# Patient Record
Sex: FEMALE | URBAN - NONMETROPOLITAN AREA
[De-identification: names, ages, dates, MRNs, and addresses within clinical notes are randomized per-mention and may not be internally consistent; named-entity substitution may affect disease eponyms.]

---

## 2021-05-10 ENCOUNTER — APPOINTMENT (OUTPATIENT)
Age: 31
Setting detail: DERMATOLOGY
End: 2021-05-10

## 2021-05-10 DIAGNOSIS — D22 MELANOCYTIC NEVI: ICD-10-CM

## 2021-05-10 DIAGNOSIS — L71.8 OTHER ROSACEA: ICD-10-CM

## 2021-05-10 DIAGNOSIS — L91.0 HYPERTROPHIC SCAR: ICD-10-CM

## 2021-05-10 DIAGNOSIS — L73.2 HIDRADENITIS SUPPURATIVA: ICD-10-CM

## 2021-05-10 PROBLEM — D22.4 MELANOCYTIC NEVI OF SCALP AND NECK: Status: ACTIVE | Noted: 2021-05-10

## 2021-05-10 PROCEDURE — OTHER COUNSELING: OTHER

## 2021-05-10 PROCEDURE — OTHER INTRALESIONAL KENALOG: OTHER

## 2021-05-10 PROCEDURE — OTHER PRESCRIPTION: OTHER

## 2021-05-10 PROCEDURE — 99203 OFFICE O/P NEW LOW 30 MIN: CPT | Mod: 25

## 2021-05-10 PROCEDURE — 11900 INJECT SKIN LESIONS </W 7: CPT

## 2021-05-10 RX ORDER — AZELAIC ACID 0.15 G/G
GEL TOPICAL
Qty: 1 | Refills: 3 | Status: ERX | COMMUNITY
Start: 2021-05-10

## 2021-05-10 RX ORDER — TAZAROTENE 0.05 MG/G
CREAM CUTANEOUS QHS
Qty: 1 | Refills: 3 | Status: ERX | COMMUNITY
Start: 2021-05-10

## 2021-05-10 RX ORDER — CLINDAMYCIN PHOSPHATE 10 MG/ML
SOLUTION TOPICAL
Qty: 1 | Refills: 6 | Status: ERX | COMMUNITY
Start: 2021-05-10

## 2021-05-10 ASSESSMENT — LOCATION SIMPLE DESCRIPTION DERM
LOCATION SIMPLE: LEFT FOREHEAD
LOCATION SIMPLE: CHEST
LOCATION SIMPLE: POSTERIOR NECK
LOCATION SIMPLE: GROIN

## 2021-05-10 ASSESSMENT — LOCATION ZONE DERM
LOCATION ZONE: VULVA
LOCATION ZONE: TRUNK
LOCATION ZONE: NECK
LOCATION ZONE: FACE

## 2021-05-10 ASSESSMENT — LOCATION DETAILED DESCRIPTION DERM
LOCATION DETAILED: MIDDLE STERNUM
LOCATION DETAILED: LEFT MEDIAL TRAPEZIAL NECK
LOCATION DETAILED: LEFT INFERIOR FOREHEAD
LOCATION DETAILED: MONS PUBIS

## 2021-05-10 NOTE — HPI: FULL BODY SKIN EXAMINATION
What Type Of Note Output Would You Prefer (Optional)?: Bullet Format
What Is The Reason For Today's Visit?: Full Body Skin Examination
What Is The Reason For Today's Visit? (Being Monitored For X): concerning skin lesions on an annual basis
Additional History: Pt states she is in the sun a lot, has moles, and wants to talk about acne treatment

## 2021-05-10 NOTE — PROCEDURE: INTRALESIONAL KENALOG
Include Z78.9 (Other Specified Conditions Influencing Health Status) As An Associated Diagnosis?: No
Kenalog Preparation: Kenalog
Validate Note Data When Using Inventory: Yes
Concentration Of Solution Injected (Mg/Ml): 0.3
Administered By (Optional): nurse
Medical Necessity Clause: This procedure was medically necessary because the lesions that were treated were:
Detail Level: Detailed
Consent: The risks of atrophy were reviewed with the patient.
Total Volume Injected (Ccs- Only Use Numbers And Decimals): 0.4
X Size Of Lesion In Cm (Optional): 0

## 2021-05-10 NOTE — PROCEDURE: COUNSELING
Doxycycline Counseling:  Patient counseled regarding possible photosensitivity and increased risk for sunburn.  Patient instructed to avoid sunlight, if possible.  When exposed to sunlight, patients should wear protective clothing, sunglasses, and sunscreen.  The patient was instructed to call the office immediately if the following severe adverse effects occur:  hearing changes, easy bruising/bleeding, severe headache, or vision changes.  The patient verbalized understanding of the proper use and possible adverse effects of doxycycline.  All of the patient's questions and concerns were addressed.
Minocycline Counseling: Patient advised regarding possible photosensitivity and discoloration of the teeth, skin, lips, tongue and gums.  Patient instructed to avoid sunlight, if possible.  When exposed to sunlight, patients should wear protective clothing, sunglasses, and sunscreen.  The patient was instructed to call the office immediately if the following severe adverse effects occur:  hearing changes, easy bruising/bleeding, severe headache, or vision changes.  The patient verbalized understanding of the proper use and possible adverse effects of minocycline.  All of the patient's questions and concerns were addressed.
Tetracycline Counseling: Patient counseled regarding possible photosensitivity and increased risk for sunburn.  Patient instructed to avoid sunlight, if possible.  When exposed to sunlight, patients should wear protective clothing, sunglasses, and sunscreen.  The patient was instructed to call the office immediately if the following severe adverse effects occur:  hearing changes, easy bruising/bleeding, severe headache, or vision changes.  The patient verbalized understanding of the proper use and possible adverse effects of tetracycline.  All of the patient's questions and concerns were addressed. Patient understands to avoid pregnancy while on therapy due to potential birth defects.
Dapsone Counseling: I discussed with the patient the risks of dapsone including but not limited to hemolytic anemia, agranulocytosis, rashes, methemoglobinemia, kidney failure, peripheral neuropathy, headaches, GI upset, and liver toxicity.  Patients who start dapsone require monitoring including baseline LFTs and weekly CBCs for the first month, then every month thereafter.  The patient verbalized understanding of the proper use and possible adverse effects of dapsone.  All of the patient's questions and concerns were addressed.
Doxycycline Pregnancy And Lactation Text: This medication is Pregnancy Category D and not consider safe during pregnancy. It is also excreted in breast milk but is considered safe for shorter treatment courses.
Tetracycline Pregnancy And Lactation Text: This medication is Pregnancy Category D and not consider safe during pregnancy. It is also excreted in breast milk.
Detail Level: Detailed
Topical Clindamycin Pregnancy And Lactation Text: This medication is Pregnancy Category B and is considered safe during pregnancy. It is unknown if it is excreted in breast milk.
Tazorac Pregnancy And Lactation Text: This medication is not safe during pregnancy. It is unknown if this medication is excreted in breast milk.
Sarecycline Counseling: Patient advised regarding possible photosensitivity and discoloration of the teeth, skin, lips, tongue and gums.  Patient instructed to avoid sunlight, if possible.  When exposed to sunlight, patients should wear protective clothing, sunglasses, and sunscreen.  The patient was instructed to call the office immediately if the following severe adverse effects occur:  hearing changes, easy bruising/bleeding, severe headache, or vision changes.  The patient verbalized understanding of the proper use and possible adverse effects of sarecycline.  All of the patient's questions and concerns were addressed.
Tazorac Counseling:  Patient advised that medication is irritating and drying.  Patient may need to apply sparingly and wash off after an hour before eventually leaving it on overnight.  The patient verbalized understanding of the proper use and possible adverse effects of tazorac.  All of the patient's questions and concerns were addressed.
Bactrim Pregnancy And Lactation Text: This medication is Pregnancy Category D and is known to cause fetal risk.  It is also excreted in breast milk.
Azithromycin Pregnancy And Lactation Text: This medication is considered safe during pregnancy and is also secreted in breast milk.
Topical Sulfur Applications Counseling: Topical Sulfur Counseling: Patient counseled that this medication may cause skin irritation or allergic reactions.  In the event of skin irritation, the patient was advised to reduce the amount of the drug applied or use it less frequently.   The patient verbalized understanding of the proper use and possible adverse effects of topical sulfur application.  All of the patient's questions and concerns were addressed.
Benzoyl Peroxide Pregnancy And Lactation Text: This medication is Pregnancy Category C. It is unknown if benzoyl peroxide is excreted in breast milk.
Isotretinoin Counseling: Patient should get monthly blood tests, not donate blood, not drive at night if vision affected, not share medication, and not undergo elective surgery for 6 months after tx completed. Side effects reviewed, pt to contact office should one occur.
Birth Control Pills Pregnancy And Lactation Text: This medication should be avoided if pregnant and for the first 30 days post-partum.
Topical Retinoid counseling:  Patient advised to apply a pea-sized amount only at bedtime and wait 30 minutes after washing their face before applying.  If too drying, patient may add a non-comedogenic moisturizer. The patient verbalized understanding of the proper use and possible adverse effects of retinoids.  All of the patient's questions and concerns were addressed.
Use Enhanced Medication Counseling?: No
Topical Retinoid Pregnancy And Lactation Text: This medication is Pregnancy Category C. It is unknown if this medication is excreted in breast milk.
Birth Control Pills Counseling: Birth Control Pill Counseling: I discussed with the patient the potential side effects of OCPs including but not limited to increased risk of stroke, heart attack, thrombophlebitis, deep venous thrombosis, hepatic adenomas, breast changes, GI upset, headaches, and depression.  The patient verbalized understanding of the proper use and possible adverse effects of OCPs. All of the patient's questions and concerns were addressed.
Topical Clindamycin Counseling: Patient counseled that this medication may cause skin irritation or allergic reactions.  In the event of skin irritation, the patient was advised to reduce the amount of the drug applied or use it less frequently.   The patient verbalized understanding of the proper use and possible adverse effects of clindamycin.  All of the patient's questions and concerns were addressed.
Spironolactone Pregnancy And Lactation Text: This medication can cause feminization of the male fetus and should be avoided during pregnancy. The active metabolite is also found in breast milk.
Topical Sulfur Applications Pregnancy And Lactation Text: This medication is Pregnancy Category C and has an unknown safety profile during pregnancy. It is unknown if this topical medication is excreted in breast milk.
Spironolactone Counseling: Patient advised regarding risks of diarrhea, abdominal pain, hyperkalemia, birth defects (for female patients), liver toxicity and renal toxicity. The patient may need blood work to monitor liver and kidney function and potassium levels while on therapy. The patient verbalized understanding of the proper use and possible adverse effects of spironolactone.  All of the patient's questions and concerns were addressed.
Azithromycin Counseling:  I discussed with the patient the risks of azithromycin including but not limited to GI upset, allergic reaction, drug rash, diarrhea, and yeast infections.
High Dose Vitamin A Pregnancy And Lactation Text: High dose vitamin A therapy is contraindicated during pregnancy and breast feeding.
Isotretinoin Pregnancy And Lactation Text: This medication is Pregnancy Category X and is considered extremely dangerous during pregnancy. It is unknown if it is excreted in breast milk.
Bactrim Counseling:  I discussed with the patient the risks of sulfa antibiotics including but not limited to GI upset, allergic reaction, drug rash, diarrhea, dizziness, photosensitivity, and yeast infections.  Rarely, more serious reactions can occur including but not limited to aplastic anemia, agranulocytosis, methemoglobinemia, blood dyscrasias, liver or kidney failure, lung infiltrates or desquamative/blistering drug rashes.
Benzoyl Peroxide Counseling: Patient counseled that medicine may cause skin irritation and bleach clothing.  In the event of skin irritation, the patient was advised to reduce the amount of the drug applied or use it less frequently.   The patient verbalized understanding of the proper use and possible adverse effects of benzoyl peroxide.  All of the patient's questions and concerns were addressed.
Erythromycin Counseling:  I discussed with the patient the risks of erythromycin including but not limited to GI upset, allergic reaction, drug rash, diarrhea, increase in liver enzymes, and yeast infections.
Erythromycin Pregnancy And Lactation Text: This medication is Pregnancy Category B and is considered safe during pregnancy. It is also excreted in breast milk.
High Dose Vitamin A Counseling: Side effects reviewed, pt to contact office should one occur.
Detail Level: Zone
Dapsone Pregnancy And Lactation Text: This medication is Pregnancy Category C and is not considered safe during pregnancy or breast feeding.

## 2021-08-10 ENCOUNTER — APPOINTMENT (OUTPATIENT)
Age: 31
Setting detail: DERMATOLOGY
End: 2021-08-26

## 2021-08-10 DIAGNOSIS — L73.2 HIDRADENITIS SUPPURATIVA: ICD-10-CM

## 2021-08-10 DIAGNOSIS — L91.0 HYPERTROPHIC SCAR: ICD-10-CM

## 2021-08-10 DIAGNOSIS — L70.0 ACNE VULGARIS: ICD-10-CM

## 2021-08-10 PROCEDURE — OTHER COUNSELING: OTHER

## 2021-08-10 PROCEDURE — OTHER ISOTRETINOIN INITIATION: OTHER

## 2021-08-10 PROCEDURE — 36415 COLL VENOUS BLD VENIPUNCTURE: CPT

## 2021-08-10 PROCEDURE — OTHER ORDER TESTS: OTHER

## 2021-08-10 PROCEDURE — OTHER INTRALESIONAL KENALOG: OTHER

## 2021-08-10 PROCEDURE — 99214 OFFICE O/P EST MOD 30 MIN: CPT | Mod: 25

## 2021-08-10 PROCEDURE — 11900 INJECT SKIN LESIONS </W 7: CPT

## 2021-08-10 PROCEDURE — OTHER VENIPUNCTURE: OTHER

## 2021-08-10 PROCEDURE — OTHER ADDITIONAL NOTES: OTHER

## 2021-08-10 ASSESSMENT — LOCATION DETAILED DESCRIPTION DERM
LOCATION DETAILED: LEFT INFERIOR FOREHEAD
LOCATION DETAILED: MONS PUBIS
LOCATION DETAILED: MIDDLE STERNUM
LOCATION DETAILED: LEFT ANTECUBITAL SKIN

## 2021-08-10 ASSESSMENT — LOCATION ZONE DERM
LOCATION ZONE: TRUNK
LOCATION ZONE: VULVA
LOCATION ZONE: ARM
LOCATION ZONE: FACE

## 2021-08-10 ASSESSMENT — LOCATION SIMPLE DESCRIPTION DERM
LOCATION SIMPLE: GROIN
LOCATION SIMPLE: CHEST
LOCATION SIMPLE: LEFT FOREHEAD
LOCATION SIMPLE: LEFT UPPER ARM

## 2021-08-10 NOTE — PROCEDURE: ADDITIONAL NOTES
Additional Notes: Pt is deciding wether or not if she wants to try a biologic like humira
Render Risk Assessment In Note?: no
Detail Level: Zone

## 2021-08-10 NOTE — PROCEDURE: ISOTRETINOIN INITIATION
Detail Level: Zone
Anticipated Starting Dosage (Optional): 40mg Daily
Patient Reported Weight (Optional - Include Units): 218

## 2021-08-10 NOTE — PROCEDURE: INTRALESIONAL KENALOG
Total Volume Injected (Ccs- Only Use Numbers And Decimals): 0.4
Medical Necessity Clause: This procedure was medically necessary because the lesions that were treated were:
Detail Level: Detailed
Consent: The risks of atrophy were reviewed with the patient.
X Size Of Lesion In Cm (Optional): 0
Include Z78.9 (Other Specified Conditions Influencing Health Status) As An Associated Diagnosis?: No
Concentration Of Solution Injected (Mg/Ml): 0.3
Kenalog Preparation: Kenalog
Validate Note Data When Using Inventory: Yes
Administered By (Optional): nurse

## 2021-08-10 NOTE — PROCEDURE: COUNSELING
Minocycline Pregnancy And Lactation Text: This medication is Pregnancy Category D and not consider safe during pregnancy. It is also excreted in breast milk.
Topical Clindamycin Counseling: Patient counseled that this medication may cause skin irritation or allergic reactions.  In the event of skin irritation, the patient was advised to reduce the amount of the drug applied or use it less frequently.   The patient verbalized understanding of the proper use and possible adverse effects of clindamycin.  All of the patient's questions and concerns were addressed.
Spironolactone Pregnancy And Lactation Text: This medication can cause feminization of the male fetus and should be avoided during pregnancy. The active metabolite is also found in breast milk.
Erythromycin Pregnancy And Lactation Text: This medication is Pregnancy Category B and is considered safe during pregnancy. It is also excreted in breast milk.
Dapsone Counseling: I discussed with the patient the risks of dapsone including but not limited to hemolytic anemia, agranulocytosis, rashes, methemoglobinemia, kidney failure, peripheral neuropathy, headaches, GI upset, and liver toxicity.  Patients who start dapsone require monitoring including baseline LFTs and weekly CBCs for the first month, then every month thereafter.  The patient verbalized understanding of the proper use and possible adverse effects of dapsone.  All of the patient's questions and concerns were addressed.
Detail Level: Detailed
Erythromycin Counseling:  I discussed with the patient the risks of erythromycin including but not limited to GI upset, allergic reaction, drug rash, diarrhea, increase in liver enzymes, and yeast infections.
Minocycline Counseling: Patient advised regarding possible photosensitivity and discoloration of the teeth, skin, lips, tongue and gums.  Patient instructed to avoid sunlight, if possible.  When exposed to sunlight, patients should wear protective clothing, sunglasses, and sunscreen.  The patient was instructed to call the office immediately if the following severe adverse effects occur:  hearing changes, easy bruising/bleeding, severe headache, or vision changes.  The patient verbalized understanding of the proper use and possible adverse effects of minocycline.  All of the patient's questions and concerns were addressed.
High Dose Vitamin A Counseling: Side effects reviewed, pt to contact office should one occur.
Tazorac Counseling:  Patient advised that medication is irritating and drying.  Patient may need to apply sparingly and wash off after an hour before eventually leaving it on overnight.  The patient verbalized understanding of the proper use and possible adverse effects of tazorac.  All of the patient's questions and concerns were addressed.
Doxycycline Counseling:  Patient counseled regarding possible photosensitivity and increased risk for sunburn.  Patient instructed to avoid sunlight, if possible.  When exposed to sunlight, patients should wear protective clothing, sunglasses, and sunscreen.  The patient was instructed to call the office immediately if the following severe adverse effects occur:  hearing changes, easy bruising/bleeding, severe headache, or vision changes.  The patient verbalized understanding of the proper use and possible adverse effects of doxycycline.  All of the patient's questions and concerns were addressed.
Doxycycline Pregnancy And Lactation Text: This medication is Pregnancy Category D and not consider safe during pregnancy. It is also excreted in breast milk but is considered safe for shorter treatment courses.
Include Pregnancy/Lactation Warning?: No
Benzoyl Peroxide Pregnancy And Lactation Text: This medication is Pregnancy Category C. It is unknown if benzoyl peroxide is excreted in breast milk.
Azithromycin Pregnancy And Lactation Text: This medication is considered safe during pregnancy and is also secreted in breast milk.
Topical Clindamycin Pregnancy And Lactation Text: This medication is Pregnancy Category B and is considered safe during pregnancy. It is unknown if it is excreted in breast milk.
Sarecycline Counseling: Patient advised regarding possible photosensitivity and discoloration of the teeth, skin, lips, tongue and gums.  Patient instructed to avoid sunlight, if possible.  When exposed to sunlight, patients should wear protective clothing, sunglasses, and sunscreen.  The patient was instructed to call the office immediately if the following severe adverse effects occur:  hearing changes, easy bruising/bleeding, severe headache, or vision changes.  The patient verbalized understanding of the proper use and possible adverse effects of sarecycline.  All of the patient's questions and concerns were addressed.
Tazorac Pregnancy And Lactation Text: This medication is not safe during pregnancy. It is unknown if this medication is excreted in breast milk.
Tetracycline Counseling: Patient counseled regarding possible photosensitivity and increased risk for sunburn.  Patient instructed to avoid sunlight, if possible.  When exposed to sunlight, patients should wear protective clothing, sunglasses, and sunscreen.  The patient was instructed to call the office immediately if the following severe adverse effects occur:  hearing changes, easy bruising/bleeding, severe headache, or vision changes.  The patient verbalized understanding of the proper use and possible adverse effects of tetracycline.  All of the patient's questions and concerns were addressed. Patient understands to avoid pregnancy while on therapy due to potential birth defects.
Topical Sulfur Applications Counseling: Topical Sulfur Counseling: Patient counseled that this medication may cause skin irritation or allergic reactions.  In the event of skin irritation, the patient was advised to reduce the amount of the drug applied or use it less frequently.   The patient verbalized understanding of the proper use and possible adverse effects of topical sulfur application.  All of the patient's questions and concerns were addressed.
Birth Control Pills Pregnancy And Lactation Text: This medication should be avoided if pregnant and for the first 30 days post-partum.
Bactrim Pregnancy And Lactation Text: This medication is Pregnancy Category D and is known to cause fetal risk.  It is also excreted in breast milk.
Topical Retinoid counseling:  Patient advised to apply a pea-sized amount only at bedtime and wait 30 minutes after washing their face before applying.  If too drying, patient may add a non-comedogenic moisturizer. The patient verbalized understanding of the proper use and possible adverse effects of retinoids.  All of the patient's questions and concerns were addressed.
Topical Retinoid Pregnancy And Lactation Text: This medication is Pregnancy Category C. It is unknown if this medication is excreted in breast milk.
Birth Control Pills Counseling: Birth Control Pill Counseling: I discussed with the patient the potential side effects of OCPs including but not limited to increased risk of stroke, heart attack, thrombophlebitis, deep venous thrombosis, hepatic adenomas, breast changes, GI upset, headaches, and depression.  The patient verbalized understanding of the proper use and possible adverse effects of OCPs. All of the patient's questions and concerns were addressed.
Azithromycin Counseling:  I discussed with the patient the risks of azithromycin including but not limited to GI upset, allergic reaction, drug rash, diarrhea, and yeast infections.
Isotretinoin Counseling: Patient should get monthly blood tests, not donate blood, not drive at night if vision affected, not share medication, and not undergo elective surgery for 6 months after tx completed. Side effects reviewed, pt to contact office should one occur.
Spironolactone Counseling: Patient advised regarding risks of diarrhea, abdominal pain, hyperkalemia, birth defects (for female patients), liver toxicity and renal toxicity. The patient may need blood work to monitor liver and kidney function and potassium levels while on therapy. The patient verbalized understanding of the proper use and possible adverse effects of spironolactone.  All of the patient's questions and concerns were addressed.
Topical Sulfur Applications Pregnancy And Lactation Text: This medication is Pregnancy Category C and has an unknown safety profile during pregnancy. It is unknown if this topical medication is excreted in breast milk.
Bactrim Counseling:  I discussed with the patient the risks of sulfa antibiotics including but not limited to GI upset, allergic reaction, drug rash, diarrhea, dizziness, photosensitivity, and yeast infections.  Rarely, more serious reactions can occur including but not limited to aplastic anemia, agranulocytosis, methemoglobinemia, blood dyscrasias, liver or kidney failure, lung infiltrates or desquamative/blistering drug rashes.
Dapsone Pregnancy And Lactation Text: This medication is Pregnancy Category C and is not considered safe during pregnancy or breast feeding.
High Dose Vitamin A Pregnancy And Lactation Text: High dose vitamin A therapy is contraindicated during pregnancy and breast feeding.
Isotretinoin Pregnancy And Lactation Text: This medication is Pregnancy Category X and is considered extremely dangerous during pregnancy. It is unknown if it is excreted in breast milk.
Benzoyl Peroxide Counseling: Patient counseled that medicine may cause skin irritation and bleach clothing.  In the event of skin irritation, the patient was advised to reduce the amount of the drug applied or use it less frequently.   The patient verbalized understanding of the proper use and possible adverse effects of benzoyl peroxide.  All of the patient's questions and concerns were addressed.

## 2021-09-13 ENCOUNTER — APPOINTMENT (OUTPATIENT)
Age: 31
Setting detail: DERMATOLOGY
End: 2021-09-13

## 2021-09-13 DIAGNOSIS — L70.0 ACNE VULGARIS: ICD-10-CM

## 2021-09-13 PROCEDURE — OTHER URINE PREGNANCY TEST: OTHER

## 2021-09-13 PROCEDURE — 81025 URINE PREGNANCY TEST: CPT

## 2021-09-13 PROCEDURE — OTHER PHOTO-DOCUMENTATION: OTHER

## 2021-09-13 PROCEDURE — 99213 OFFICE O/P EST LOW 20 MIN: CPT

## 2021-09-13 PROCEDURE — OTHER ISOTRETINOIN MONITORING: OTHER

## 2021-09-13 PROCEDURE — OTHER PRESCRIPTION: OTHER

## 2021-09-13 RX ORDER — ISOTRETINOIN 40 MG/1
CAPSULE, LIQUID FILLED ORAL
Qty: 30 | Refills: 0 | Status: ERX | COMMUNITY
Start: 2021-09-13

## 2021-09-13 NOTE — PROCEDURE: PHOTO-DOCUMENTATION
Photo Preface (Leave Blank If You Do Not Want): Photographs were obtained today
Detail Level: Zone
Details (Free Text): 9/13/2021

## 2021-09-16 ENCOUNTER — RX ONLY (RX ONLY)
Age: 31
End: 2021-09-16

## 2021-09-16 RX ORDER — ISOTRETINOIN 40 MG/1
CAPSULE, LIQUID FILLED ORAL
Qty: 30 | Refills: 0 | Status: ERX

## 2021-10-13 ENCOUNTER — APPOINTMENT (OUTPATIENT)
Age: 31
Setting detail: DERMATOLOGY
End: 2021-11-03

## 2021-10-13 DIAGNOSIS — L70.0 ACNE VULGARIS: ICD-10-CM

## 2021-10-13 PROCEDURE — OTHER PRESCRIPTION: OTHER

## 2021-10-13 PROCEDURE — OTHER URINE PREGNANCY TEST: OTHER

## 2021-10-13 PROCEDURE — 81025 URINE PREGNANCY TEST: CPT

## 2021-10-13 PROCEDURE — 99214 OFFICE O/P EST MOD 30 MIN: CPT

## 2021-10-13 PROCEDURE — OTHER ISOTRETINOIN MONITORING: OTHER

## 2021-10-13 PROCEDURE — OTHER PHOTO-DOCUMENTATION: OTHER

## 2021-10-13 PROCEDURE — OTHER ORDER TESTS: OTHER

## 2021-10-13 RX ORDER — ISOTRETINOIN 30 MG/1
CAPSULE ORAL
Qty: 30 | Refills: 0 | Status: ERX | COMMUNITY
Start: 2021-10-13

## 2021-10-13 NOTE — PROCEDURE: PHOTO-DOCUMENTATION
Detail Level: Zone
Details (Free Text): 9/13/2021
Photo Preface (Leave Blank If You Do Not Want): Photographs were obtained today

## 2021-11-16 ENCOUNTER — APPOINTMENT (OUTPATIENT)
Age: 31
Setting detail: DERMATOLOGY
End: 2021-11-16

## 2021-11-16 DIAGNOSIS — L70.0 ACNE VULGARIS: ICD-10-CM

## 2021-11-16 DIAGNOSIS — L20.89 OTHER ATOPIC DERMATITIS: ICD-10-CM

## 2021-11-16 PROCEDURE — 36415 COLL VENOUS BLD VENIPUNCTURE: CPT

## 2021-11-16 PROCEDURE — OTHER VENIPUNCTURE: OTHER

## 2021-11-16 PROCEDURE — OTHER PRESCRIPTION: OTHER

## 2021-11-16 PROCEDURE — 81025 URINE PREGNANCY TEST: CPT

## 2021-11-16 PROCEDURE — OTHER ORDER TESTS: OTHER

## 2021-11-16 PROCEDURE — 99214 OFFICE O/P EST MOD 30 MIN: CPT | Mod: 25

## 2021-11-16 PROCEDURE — OTHER URINE PREGNANCY TEST: OTHER

## 2021-11-16 PROCEDURE — OTHER PRESCRIPTION MEDICATION MANAGEMENT: OTHER

## 2021-11-16 PROCEDURE — OTHER COUNSELING: OTHER

## 2021-11-16 PROCEDURE — OTHER ISOTRETINOIN MONITORING: OTHER

## 2021-11-16 RX ORDER — ISOTRETINOIN 30 MG/1
CAPSULE ORAL
Qty: 30 | Refills: 0 | Status: ERX

## 2021-11-16 RX ORDER — TRIAMCINOLONE ACETONIDE 1 MG/G
CREAM TOPICAL BID
Qty: 45 | Refills: 2 | Status: ERX | COMMUNITY
Start: 2021-11-16

## 2021-11-16 ASSESSMENT — LOCATION ZONE DERM
LOCATION ZONE: HAND
LOCATION ZONE: ARM

## 2021-11-16 ASSESSMENT — LOCATION DETAILED DESCRIPTION DERM
LOCATION DETAILED: LEFT RADIAL DORSAL HAND
LOCATION DETAILED: RIGHT RADIAL DORSAL HAND
LOCATION DETAILED: LEFT ANTECUBITAL SKIN

## 2021-11-16 ASSESSMENT — LOCATION SIMPLE DESCRIPTION DERM
LOCATION SIMPLE: LEFT HAND
LOCATION SIMPLE: LEFT UPPER ARM
LOCATION SIMPLE: RIGHT HAND

## 2021-12-08 ENCOUNTER — APPOINTMENT (OUTPATIENT)
Age: 31
Setting detail: DERMATOLOGY
End: 2021-12-08

## 2021-12-08 DIAGNOSIS — L70.0 ACNE VULGARIS: ICD-10-CM

## 2021-12-08 PROCEDURE — OTHER URINE PREGNANCY TEST: OTHER

## 2021-12-08 PROCEDURE — OTHER PRESCRIPTION: OTHER

## 2021-12-08 PROCEDURE — OTHER ISOTRETINOIN MONITORING: OTHER

## 2021-12-08 PROCEDURE — 99213 OFFICE O/P EST LOW 20 MIN: CPT

## 2021-12-08 PROCEDURE — 81025 URINE PREGNANCY TEST: CPT

## 2021-12-08 RX ORDER — ISOTRETINOIN 30 MG/1
CAPSULE ORAL
Qty: 30 | Refills: 0 | Status: ERX

## 2021-12-13 ENCOUNTER — RX ONLY (RX ONLY)
Age: 31
End: 2021-12-13

## 2021-12-13 RX ORDER — ISOTRETINOIN 30 MG/1
CAPSULE, LIQUID FILLED ORAL
Qty: 30 | Refills: 0 | Status: ERX

## 2022-01-11 ENCOUNTER — APPOINTMENT (OUTPATIENT)
Age: 32
Setting detail: DERMATOLOGY
End: 2022-01-13

## 2022-01-11 DIAGNOSIS — L70.0 ACNE VULGARIS: ICD-10-CM

## 2022-01-11 PROCEDURE — OTHER PRESCRIPTION: OTHER

## 2022-01-11 PROCEDURE — OTHER ISOTRETINOIN MONITORING: OTHER

## 2022-01-11 PROCEDURE — OTHER URINE PREGNANCY TEST: OTHER

## 2022-01-11 PROCEDURE — 99213 OFFICE O/P EST LOW 20 MIN: CPT

## 2022-01-11 PROCEDURE — 81025 URINE PREGNANCY TEST: CPT

## 2022-01-11 RX ORDER — ISOTRETINOIN 30 MG/1
CAPSULE ORAL
Qty: 30 | Refills: 0 | Status: ERX

## 2022-02-10 ENCOUNTER — APPOINTMENT (OUTPATIENT)
Age: 32
Setting detail: DERMATOLOGY
End: 2022-02-10

## 2022-02-10 DIAGNOSIS — L70.0 ACNE VULGARIS: ICD-10-CM

## 2022-02-10 PROCEDURE — OTHER ISOTRETINOIN MONITORING: OTHER

## 2022-02-10 PROCEDURE — 81025 URINE PREGNANCY TEST: CPT

## 2022-02-10 PROCEDURE — 99213 OFFICE O/P EST LOW 20 MIN: CPT

## 2022-02-10 PROCEDURE — OTHER URINE PREGNANCY TEST: OTHER

## 2022-02-10 PROCEDURE — OTHER PRESCRIPTION: OTHER

## 2022-02-10 RX ORDER — ISOTRETINOIN 40 MG/1
CAPSULE, LIQUID FILLED ORAL
Qty: 30 | Refills: 0 | Status: ERX | COMMUNITY
Start: 2022-02-10

## 2022-03-10 ENCOUNTER — APPOINTMENT (OUTPATIENT)
Age: 32
Setting detail: DERMATOLOGY
End: 2022-03-10

## 2022-03-10 VITALS — HEIGHT: 64 IN | WEIGHT: 211 LBS

## 2022-03-10 DIAGNOSIS — L70.0 ACNE VULGARIS: ICD-10-CM

## 2022-03-10 DIAGNOSIS — Z79.899 OTHER LONG TERM (CURRENT) DRUG THERAPY: ICD-10-CM

## 2022-03-10 PROCEDURE — OTHER COUNSELING: OTHER

## 2022-03-10 PROCEDURE — OTHER MIPS QUALITY: OTHER

## 2022-03-10 PROCEDURE — OTHER HIGH RISK MEDICATION MONITORING: OTHER

## 2022-03-10 PROCEDURE — 99213 OFFICE O/P EST LOW 20 MIN: CPT

## 2022-03-10 PROCEDURE — 81025 URINE PREGNANCY TEST: CPT

## 2022-03-10 PROCEDURE — OTHER PRESCRIPTION: OTHER

## 2022-03-10 PROCEDURE — OTHER MEDICATION COUNSELING: OTHER

## 2022-03-10 PROCEDURE — OTHER ISOTRETINOIN MONITORING: OTHER

## 2022-03-10 PROCEDURE — OTHER PATIENT SPECIFIC COUNSELING: OTHER

## 2022-03-10 PROCEDURE — OTHER URINE PREGNANCY TEST: OTHER

## 2022-03-10 RX ORDER — ISOTRETINOIN 30 MG/1
CAPSULE, LIQUID FILLED ORAL
Qty: 60 | Refills: 0 | Status: ERX

## 2022-03-10 RX ORDER — ISOTRETINOIN 40 MG/1
CAPSULE, LIQUID FILLED ORAL
Qty: 30 | Refills: 0 | Status: CANCELLED

## 2022-03-10 NOTE — PROCEDURE: PATIENT SPECIFIC COUNSELING
Counselled about isotretinoin. Do not drink alcohol while on isotretinoin. Do not get on any crazy diets when on isotretinoin. Do not take herbal supplements or multivitamins. Do not take Tylenol while on isotretinoin, it is ok to take Ibuprofen or Alleve. You must understand that if you get pregnant on isotretinoin there is a serious risk of permanent birth defects. Patient understands the risk and agreed to be on two methods of birth control during the entire Accutane regimen and for one month after the drug is discontinued. Recommend Plan B if either of the two methods should fail (such as if a condom breaks, or you forget a birth control pill). Isotretinoin has caused liver damage, pancreatitis, IBS, joint pain, severe depression, and severe dryness. Only use the products you were given today: Cerave hydrating cleanser, Cerave AM sunscreen, Cerave PM oil free moisturizer, and Aquafor 10 times a day on your lips. Do not use: toners, masks, scrubs, astringents, waxing, lasers, or get tattoos while on isotretinoin. If you wax on Accutane, your skin will come off with the wax. If you get sick, you must alert your PCP that you are on isotretinoin before you are given a new medicine. Certain antibiotics and medicines are not compatible with isotretinoin. Stop your isotretinoin if you are given a new medicine and alert the PA about the new medicine. Alert the physician assistant Selina Dela Cruz ([phone]) if any side effects develop. Your skin on the body will get dry - recommend Cerave lotion or Hylatopic prescription moisturizer. The patient signed all consents and they were visualized by PA in [employer]. The patient understands that she will need to be seen every month and will need to get fasting labwork every month to continue with isotretinoin. You will not be given a prescription for Accutane if you do not have current labwork in the office. It is highly recommended you bring your labwork with you to each appointment so your prescription is not delayed. Women have one week to  their Accutane prescription. If you do not take your IPLEDGE quiz and  your prescription within the 7 day window, the IPLEDGE system requires you must return to clinic for a urine pregnancy test before receiving a new prescription. Counselled about isotretinoin. Do not drink alcohol while on isotretinoin. Do not get on any crazy diets when on isotretinoin. Do not take herbal supplements or multivitamins. Do not take Tylenol while on isotretinoin, it is ok to take Ibuprofen or Alleve. You must understand that if you get pregnant on isotretinoin there is a serious risk of permanent birth defects. Patient understands the risk and agreed to be on two methods of birth control during the entire Accutane regimen and for one month after the drug is discontinued. Recommend Plan B if either of the two methods should fail (such as if a condom breaks, or you forget a birth control pill). Isotretinoin has caused liver damage, pancreatitis, IBS, joint pain, severe depression, and severe dryness. Only use the products you were given today: Cerave hydrating cleanser, Cerave AM sunscreen, Cerave PM oil free moisturizer, and Aquafor 10 times a day on your lips. Do not use: toners, masks, scrubs, astringents, waxing, lasers, or get tattoos while on isotretinoin. If you wax on Accutane, your skin will come off with the wax. If you get sick, you must alert your PCP that you are on isotretinoin before you are given a new medicine. Certain antibiotics and medicines are not compatible with isotretinoin. Stop your isotretinoin if you are given a new medicine and alert the PA about the new medicine. Alert the physician assistant Selina Dela Cruz ([phone]) if any side effects develop. Your skin on the body will get dry - recommend Cerave lotion or Hylatopic prescription moisturizer. The patient signed all consents and they were visualized by PA in [employer]. The patient understands that she will need to be seen every month and will need to get fasting labwork every month to continue with isotretinoin. You will not be given a prescription for Accutane if you do not have current labwork in the office. It is highly recommended you bring your labwork with you to each appointment so your prescription is not delayed. Women have one week to  their Accutane prescription. If you do not take your IPLEDGE quiz and  your prescription within the 7 day window, the IPLEDGE system requires you must return to clinic for a urine pregnancy test before receiving a new prescription.

## 2022-03-10 NOTE — PROCEDURE: HIGH RISK MEDICATION MONITORING
Xeleligioz Pregnancy And Lactation Text: This medication is Pregnancy Category D and is not considered safe during pregnancy.  The risk during breast feeding is also uncertain.

## 2022-03-10 NOTE — PROCEDURE: HIGH RISK MEDICATION MONITORING
Normal for race Bactrim Counseling:  I discussed with the patient the risks of sulfa antibiotics including but not limited to GI upset, allergic reaction, drug rash, diarrhea, dizziness, photosensitivity, and yeast infections.  Rarely, more serious reactions can occur including but not limited to aplastic anemia, agranulocytosis, methemoglobinemia, blood dyscrasias, liver or kidney failure, lung infiltrates or desquamative/blistering drug rashes.

## 2022-04-12 ENCOUNTER — APPOINTMENT (OUTPATIENT)
Age: 32
Setting detail: DERMATOLOGY
End: 2022-04-12

## 2022-04-12 DIAGNOSIS — L70.0 ACNE VULGARIS: ICD-10-CM

## 2022-04-12 DIAGNOSIS — Z79.899 OTHER LONG TERM (CURRENT) DRUG THERAPY: ICD-10-CM

## 2022-04-12 PROCEDURE — 99213 OFFICE O/P EST LOW 20 MIN: CPT

## 2022-04-12 PROCEDURE — OTHER MEDICATION COUNSELING: OTHER

## 2022-04-12 PROCEDURE — OTHER PRESCRIPTION: OTHER

## 2022-04-12 PROCEDURE — OTHER PATIENT SPECIFIC COUNSELING: OTHER

## 2022-04-12 PROCEDURE — OTHER MIPS QUALITY: OTHER

## 2022-04-12 PROCEDURE — 81025 URINE PREGNANCY TEST: CPT

## 2022-04-12 PROCEDURE — OTHER ISOTRETINOIN MONITORING: OTHER

## 2022-04-12 PROCEDURE — OTHER URINE PREGNANCY TEST: OTHER

## 2022-04-12 RX ORDER — ISOTRETINOIN 30 MG/1
CAPSULE, LIQUID FILLED ORAL
Qty: 60 | Refills: 0 | Status: CANCELLED

## 2022-04-12 NOTE — PROCEDURE: PATIENT SPECIFIC COUNSELING
This patient had an incident last month of diarrhea nausea and facial flushing. It has resolved. Because of this we went from 60 mg daily to 30 mg daily. She still has a few of the 30 mg pills and she agrees to go ahead and finish those. I explained to her that it is best to get to 150 mg per kilogram of weight and height to have the best chance of clearance long-term, the patient is only at 88 mg per kilogram of weight and height. The patient, however, is ready to discontinue. In the future if she feels her acne is returning I have explained to her I am willing to put her on Accutane again if she wishes but we will go ahead and per her wishes discontinue her today.

## 2022-04-12 NOTE — PROCEDURE: MEDICATION COUNSELING
samel Humira Counseling:  I discussed with the patient the risks of adalimumab including but not limited to myelosuppression, immunosuppression, autoimmune hepatitis, demyelinating diseases, lymphoma, and serious infections.  The patient understands that monitoring is required including a PPD at baseline and must alert us or the primary physician if symptoms of infection or other concerning signs are noted.

## 2022-04-12 NOTE — PROCEDURE: PATIENT SPECIFIC COUNSELING
Counselled about isotretinoin. Do not drink alcohol while on isotretinoin. Do not get on any crazy diets when on isotretinoin. Do not take herbal supplements or multivitamins. Do not take Tylenol while on isotretinoin, it is ok to take Ibuprofen or Alleve. You must understand that if you get pregnant on isotretinoin there is a serious risk of permanent birth defects. Patient understands the risk and agreed to be on two methods of birth control during the entire Accutane regimen and for one month after the drug is discontinued. Recommend Plan B if either of the two methods should fail (such as if a condom breaks, or you forget a birth control pill). Isotretinoin has caused liver damage, pancreatitis, IBS, joint pain, severe depression, and severe dryness. Only use the products you were given today: Cerave hydrating cleanser, Cerave AM sunscreen, Cerave PM oil free moisturizer, and Aquafor 10 times a day on your lips. Do not use: toners, masks, scrubs, astringents, waxing, lasers, or get tattoos while on isotretinoin. If you wax on Accutane, your skin will come off with the wax. If you get sick, you must alert your PCP that you are on isotretinoin before you are given a new medicine. Certain antibiotics and medicines are not compatible with isotretinoin. Stop your isotretinoin if you are given a new medicine and alert the PA about the new medicine. Alert the physician assistant Selina Dela Cruz ([phone]) if any side effects develop. Your skin on the body will get dry - recommend Cerave lotion or Hylatopic prescription moisturizer. The patient signed all consents and they were visualized by PA in [employer]. The patient understands that she will need to be seen every month and will need to get fasting labwork every month to continue with isotretinoin. You will not be given a prescription for Accutane if you do not have current labwork in the office. It is highly recommended you bring your labwork with you to each appointment so your prescription is not delayed. Women have one week to  their Accutane prescription. If you do not take your IPLEDGE quiz and  your prescription within the 7 day window, the IPLEDGE system requires you must return to clinic for a urine pregnancy test before receiving a new prescription. Counselled about isotretinoin. Do not drink alcohol while on isotretinoin. Do not get on any crazy diets when on isotretinoin. Do not take herbal supplements or multivitamins. Do not take Tylenol while on isotretinoin, it is ok to take Ibuprofen or Alleve. You must understand that if you get pregnant on isotretinoin there is a serious risk of permanent birth defects. Patient understands the risk and agreed to be on two methods of birth control during the entire Accutane regimen and for one month after the drug is discontinued. Recommend Plan B if either of the two methods should fail (such as if a condom breaks, or you forget a birth control pill). Isotretinoin has caused liver damage, pancreatitis, IBS, joint pain, severe depression, and severe dryness. Only use the products you were given today: Cerave hydrating cleanser, Cerave AM sunscreen, Cerave PM oil free moisturizer, and Aquafor 10 times a day on your lips. Do not use: toners, masks, scrubs, astringents, waxing, lasers, or get tattoos while on isotretinoin. If you wax on Accutane, your skin will come off with the wax. If you get sick, you must alert your PCP that you are on isotretinoin before you are given a new medicine. Certain antibiotics and medicines are not compatible with isotretinoin. Stop your isotretinoin if you are given a new medicine and alert the PA about the new medicine. Alert the physician assistant Selina Dela Cruz ([phone]) if any side effects develop. Your skin on the body will get dry - recommend Cerave lotion or Hylatopic prescription moisturizer. The patient signed all consents and they were visualized by PA in [employer]. The patient understands that she will need to be seen every month and will need to get fasting labwork every month to continue with isotretinoin. You will not be given a prescription for Accutane if you do not have current labwork in the office. It is highly recommended you bring your labwork with you to each appointment so your prescription is not delayed. Women have one week to  their Accutane prescription. If you do not take your IPLEDGE quiz and  your prescription within the 7 day window, the IPLEDGE system requires you must return to clinic for a urine pregnancy test before receiving a new prescription.

## 2022-04-12 NOTE — PROCEDURE: MEDICATION COUNSELING
(1) body pink, extremities blue Isotretinoin Pregnancy And Lactation Text: This medication is Pregnancy Category X and is considered extremely dangerous during pregnancy. It is unknown if it is excreted in breast milk.

## 2022-04-12 NOTE — PROCEDURE: MEDICATION COUNSELING
pt with mechanical fall, no associated prodromes, Xray negative for acute fx or bony injury, ACE wrap applied, encouraged RICE to affected region, weight bear as tolerated, f/u ortho, pt verbalized understanding. Thalidomide Counseling: I discussed with the patient the risks of thalidomide including but not limited to birth defects, anxiety, weakness, chest pain, dizziness, cough and severe allergy.

## 2022-05-06 NOTE — PROCEDURE: MEDICATION COUNSELING
Electronic refill for Potassium Chloride  Last filled Jan 2022  Sent to md for review High Dose Vitamin A Counseling: Side effects reviewed, pt to contact office should one occur.

## 2022-05-17 ENCOUNTER — APPOINTMENT (OUTPATIENT)
Age: 32
Setting detail: DERMATOLOGY
End: 2022-05-17

## 2022-05-17 DIAGNOSIS — Z79.899 OTHER LONG TERM (CURRENT) DRUG THERAPY: ICD-10-CM

## 2022-05-17 DIAGNOSIS — L70.0 ACNE VULGARIS: ICD-10-CM

## 2022-05-17 PROCEDURE — OTHER PATIENT SPECIFIC COUNSELING: OTHER

## 2022-05-17 PROCEDURE — OTHER URINE PREGNANCY TEST: OTHER

## 2022-05-17 PROCEDURE — 36415 COLL VENOUS BLD VENIPUNCTURE: CPT

## 2022-05-17 PROCEDURE — 81025 URINE PREGNANCY TEST: CPT

## 2022-05-17 PROCEDURE — OTHER ORDER TESTS: OTHER

## 2022-05-17 PROCEDURE — OTHER VENIPUNCTURE: OTHER

## 2022-05-17 PROCEDURE — OTHER MIPS QUALITY: OTHER

## 2022-05-17 ASSESSMENT — LOCATION ZONE DERM: LOCATION ZONE: ARM

## 2022-05-17 ASSESSMENT — LOCATION SIMPLE DESCRIPTION DERM: LOCATION SIMPLE: RIGHT UPPER ARM

## 2022-05-17 ASSESSMENT — LOCATION DETAILED DESCRIPTION DERM: LOCATION DETAILED: RIGHT ANTECUBITAL SKIN

## 2022-05-17 NOTE — PROCEDURE: PATIENT SPECIFIC COUNSELING
This pt one month post accutane. Pt was not discontinued properly by our clinic. Will discontinue her in ipledge now.
Detail Level: Detailed

## 2024-03-05 NOTE — PROCEDURE: MEDICATION COUNSELING
Addended by: LUCY RUDD on: 3/5/2024 10:26 AM     Modules accepted: Orders     Azithromycin Counseling:  I discussed with the patient the risks of azithromycin including but not limited to GI upset, allergic reaction, drug rash, diarrhea, and yeast infections.

## 2024-03-26 NOTE — PROCEDURE: MEDICATION COUNSELING
Stelara Counseling:  I discussed with the patient the risks of ustekinumab including but not limited to immunosuppression, malignancy, posterior leukoencephalopathy syndrome, and serious infections.  The patient understands that monitoring is required including a PPD at baseline and must alert us or the primary physician if symptoms of infection or other concerning signs are noted. done

## 2025-07-09 NOTE — PROCEDURE: MEDICATION COUNSELING
170.18 Itraconazole Counseling:  I discussed with the patient the risks of itraconazole including but not limited to liver damage, nausea/vomiting, neuropathy, and severe allergy.  The patient understands that this medication is best absorbed when taken with acidic beverages such as non-diet cola or ginger ale.  The patient understands that monitoring is required including baseline LFTs and repeat LFTs at intervals.  The patient understands that they are to contact us or the primary physician if concerning signs are noted.